# Patient Record
Sex: FEMALE | Race: BLACK OR AFRICAN AMERICAN | NOT HISPANIC OR LATINO | Employment: FULL TIME | URBAN - METROPOLITAN AREA
[De-identification: names, ages, dates, MRNs, and addresses within clinical notes are randomized per-mention and may not be internally consistent; named-entity substitution may affect disease eponyms.]

---

## 2023-04-28 LAB
CHLAMYDIA TRACH., AMPLIFIED: NEGATIVE
N. GONORRHEA, AMPLIFIED: NEGATIVE

## 2023-05-01 LAB
PROLACTIN (UG/L) IN SER/PLAS: 2.3 UG/L (ref 3–20)
THYROTROPIN (MIU/L) IN SER/PLAS BY DETECTION LIMIT <= 0.05 MIU/L: 1.71 MIU/L (ref 0.44–3.98)

## 2023-10-26 ENCOUNTER — TELEPHONE (OUTPATIENT)
Dept: PRIMARY CARE | Facility: HOSPITAL | Age: 38
End: 2023-10-26
Payer: COMMERCIAL

## 2023-10-26 DIAGNOSIS — I10 PRIMARY HYPERTENSION: Primary | ICD-10-CM

## 2023-10-26 RX ORDER — CARVEDILOL 25 MG/1
25 TABLET ORAL
Qty: 60 TABLET | Refills: 0 | Status: SHIPPED | OUTPATIENT
Start: 2023-10-26 | End: 2023-11-08 | Stop reason: SDUPTHER

## 2023-10-26 NOTE — TELEPHONE ENCOUNTER
Give patient enough Carvedilol to last until her appointment on 11/8/23.   States she want it to go to [facility]  [id number] [facility]

## 2023-11-07 PROBLEM — G47.33 OBSTRUCTIVE SLEEP APNEA (ADULT) (PEDIATRIC): Status: ACTIVE | Noted: 2023-11-07

## 2023-11-07 PROBLEM — E78.5 HYPERLIPIDEMIA: Status: ACTIVE | Noted: 2023-11-07

## 2023-11-07 PROBLEM — N76.0 VAGINITIS: Status: ACTIVE | Noted: 2023-11-07

## 2023-11-07 PROBLEM — R29.818 SUSPECTED SLEEP APNEA: Status: ACTIVE | Noted: 2023-11-07

## 2023-11-07 PROBLEM — N92.6 MISSED MENSES: Status: ACTIVE | Noted: 2023-11-07

## 2023-11-07 PROBLEM — E87.6 HYPOKALEMIA: Status: ACTIVE | Noted: 2023-11-07

## 2023-11-07 PROBLEM — I10 SEVERE UNCONTROLLED HYPERTENSION: Status: ACTIVE | Noted: 2023-11-07

## 2023-11-07 PROBLEM — L91.0 KELOID: Status: ACTIVE | Noted: 2021-07-20

## 2023-11-07 PROBLEM — B96.89 BACTERIAL VAGINOSIS: Status: ACTIVE | Noted: 2023-11-07

## 2023-11-07 PROBLEM — E04.9 GOITER: Status: ACTIVE | Noted: 2023-11-07

## 2023-11-07 PROBLEM — N76.0 BACTERIAL VAGINOSIS: Status: ACTIVE | Noted: 2023-11-07

## 2023-11-07 PROBLEM — I10 HYPERTENSION: Status: ACTIVE | Noted: 2023-11-07

## 2023-11-07 PROBLEM — I89.0 LYMPHEDEMA: Status: ACTIVE | Noted: 2023-11-07

## 2023-11-07 PROBLEM — L70.0 ACNE VULGARIS: Status: ACTIVE | Noted: 2021-07-20

## 2023-11-07 PROBLEM — A60.00 GENITAL HSV: Status: ACTIVE | Noted: 2023-11-07

## 2023-11-07 PROBLEM — E55.9 VITAMIN D DEFICIENCY: Status: ACTIVE | Noted: 2023-11-07

## 2023-11-07 PROBLEM — T14.8XXA SURGICAL WOUND PRESENT: Status: ACTIVE | Noted: 2023-11-07

## 2023-11-07 PROBLEM — F17.200 NICOTINE DEPENDENCE: Status: ACTIVE | Noted: 2023-11-07

## 2023-11-07 PROBLEM — E66.9 OBESITY (BMI 30.0-34.9): Status: ACTIVE | Noted: 2023-11-07

## 2023-11-07 PROBLEM — E66.811 OBESITY (BMI 30.0-34.9): Status: ACTIVE | Noted: 2023-11-07

## 2023-11-07 PROBLEM — L81.9 DISORDER OF PIGMENTATION, UNSPECIFIED: Status: ACTIVE | Noted: 2021-07-20

## 2023-11-07 RX ORDER — ATORVASTATIN CALCIUM 40 MG/1
1 TABLET, FILM COATED ORAL NIGHTLY
COMMUNITY
Start: 2020-06-23 | End: 2023-11-08 | Stop reason: ALTCHOICE

## 2023-11-07 RX ORDER — LEVONORGESTREL AND ETHINYL ESTRADIOL 0.15-0.03
1 KIT ORAL DAILY
COMMUNITY
End: 2024-04-08

## 2023-11-07 RX ORDER — POTASSIUM CHLORIDE 750 MG/1
10 TABLET, FILM COATED, EXTENDED RELEASE ORAL DAILY
COMMUNITY
Start: 2020-07-29 | End: 2023-11-08 | Stop reason: WASHOUT

## 2023-11-07 RX ORDER — CARVEDILOL 6.25 MG/1
1 TABLET ORAL
COMMUNITY
Start: 2021-11-29 | End: 2023-11-08 | Stop reason: WASHOUT

## 2023-11-07 RX ORDER — IBUPROFEN 600 MG/1
600 TABLET ORAL EVERY 6 HOURS
COMMUNITY
Start: 2020-01-03

## 2023-11-07 RX ORDER — AMLODIPINE BESYLATE 10 MG/1
10 TABLET ORAL DAILY
COMMUNITY
End: 2023-11-08 | Stop reason: SDUPTHER

## 2023-11-07 RX ORDER — CHLORHEXIDINE GLUCONATE ORAL RINSE 1.2 MG/ML
15 SOLUTION DENTAL AS NEEDED
COMMUNITY
Start: 2022-12-18

## 2023-11-07 RX ORDER — TRETINOIN 0.25 MG/G
1 CREAM TOPICAL NIGHTLY
COMMUNITY
Start: 2021-07-16

## 2023-11-07 RX ORDER — MICONAZOLE NITRATE 2 %
2 CREAM (GRAM) TOPICAL AS NEEDED
COMMUNITY
Start: 2022-04-05

## 2023-11-07 RX ORDER — CARVEDILOL 12.5 MG/1
1 TABLET ORAL 2 TIMES DAILY
COMMUNITY
Start: 2021-03-09 | End: 2023-11-08 | Stop reason: WASHOUT

## 2023-11-07 RX ORDER — CHLORTHALIDONE 25 MG/1
25 TABLET ORAL EVERY 24 HOURS
COMMUNITY
End: 2023-11-08 | Stop reason: WASHOUT

## 2023-11-07 RX ORDER — VIT C/E/ZN/COPPR/LUTEIN/ZEAXAN 250MG-90MG
1 CAPSULE ORAL DAILY
COMMUNITY
Start: 2022-03-14

## 2023-11-07 RX ORDER — LEVONORGESTREL AND ETHINYL ESTRADIOL 0.15-0.03
1 KIT ORAL DAILY
COMMUNITY

## 2023-11-08 ENCOUNTER — OFFICE VISIT (OUTPATIENT)
Dept: PRIMARY CARE | Facility: HOSPITAL | Age: 38
End: 2023-11-08
Payer: COMMERCIAL

## 2023-11-08 ENCOUNTER — APPOINTMENT (OUTPATIENT)
Dept: PRIMARY CARE | Facility: HOSPITAL | Age: 38
End: 2023-11-08
Payer: COMMERCIAL

## 2023-11-08 VITALS
OXYGEN SATURATION: 98 % | HEART RATE: 73 BPM | BODY MASS INDEX: 37.39 KG/M2 | TEMPERATURE: 98 F | SYSTOLIC BLOOD PRESSURE: 129 MMHG | DIASTOLIC BLOOD PRESSURE: 85 MMHG | HEIGHT: 63 IN | WEIGHT: 211 LBS

## 2023-11-08 DIAGNOSIS — E78.5 HYPERLIPIDEMIA, UNSPECIFIED HYPERLIPIDEMIA TYPE: Primary | ICD-10-CM

## 2023-11-08 DIAGNOSIS — Z00.00 HEALTHCARE MAINTENANCE: ICD-10-CM

## 2023-11-08 DIAGNOSIS — I10 PRIMARY HYPERTENSION: ICD-10-CM

## 2023-11-08 LAB
ALBUMIN SERPL BCP-MCNC: 4.5 G/DL (ref 3.4–5)
ALP SERPL-CCNC: 32 U/L (ref 33–110)
ALT SERPL W P-5'-P-CCNC: 20 U/L (ref 7–45)
ANION GAP SERPL CALC-SCNC: 14 MMOL/L (ref 10–20)
AST SERPL W P-5'-P-CCNC: 17 U/L (ref 9–39)
BILIRUB SERPL-MCNC: 0.4 MG/DL (ref 0–1.2)
BUN SERPL-MCNC: 10 MG/DL (ref 6–23)
CALCIUM SERPL-MCNC: 9.2 MG/DL (ref 8.6–10.6)
CHLORIDE SERPL-SCNC: 107 MMOL/L (ref 98–107)
CHOLEST SERPL-MCNC: 157 MG/DL (ref 0–199)
CHOLESTEROL/HDL RATIO: 3.8
CO2 SERPL-SCNC: 21 MMOL/L (ref 21–32)
CREAT SERPL-MCNC: 0.63 MG/DL (ref 0.5–1.05)
ERYTHROCYTE [DISTWIDTH] IN BLOOD BY AUTOMATED COUNT: 13.7 % (ref 11.5–14.5)
EST. AVERAGE GLUCOSE BLD GHB EST-MCNC: 108 MG/DL
GFR SERPL CREATININE-BSD FRML MDRD: >90 ML/MIN/1.73M*2
GLUCOSE SERPL-MCNC: 90 MG/DL (ref 74–99)
HBA1C MFR BLD: 5.4 %
HCT VFR BLD AUTO: 39.1 % (ref 36–46)
HDLC SERPL-MCNC: 40.8 MG/DL
HGB BLD-MCNC: 13 G/DL (ref 12–16)
LDLC SERPL CALC-MCNC: 78 MG/DL
MCH RBC QN AUTO: 31.7 PG (ref 26–34)
MCHC RBC AUTO-ENTMCNC: 33.2 G/DL (ref 32–36)
MCV RBC AUTO: 95 FL (ref 80–100)
NON HDL CHOLESTEROL: 116 MG/DL (ref 0–149)
NRBC BLD-RTO: 0 /100 WBCS (ref 0–0)
PLATELET # BLD AUTO: 317 X10*3/UL (ref 150–450)
POTASSIUM SERPL-SCNC: 3.8 MMOL/L (ref 3.5–5.3)
PROT SERPL-MCNC: 7.4 G/DL (ref 6.4–8.2)
RBC # BLD AUTO: 4.1 X10*6/UL (ref 4–5.2)
SODIUM SERPL-SCNC: 138 MMOL/L (ref 136–145)
TRIGL SERPL-MCNC: 189 MG/DL (ref 0–149)
VLDL: 38 MG/DL (ref 0–40)
WBC # BLD AUTO: 8.8 X10*3/UL (ref 4.4–11.3)

## 2023-11-08 PROCEDURE — 85027 COMPLETE CBC AUTOMATED: CPT

## 2023-11-08 PROCEDURE — 80053 COMPREHEN METABOLIC PANEL: CPT

## 2023-11-08 PROCEDURE — 83036 HEMOGLOBIN GLYCOSYLATED A1C: CPT

## 2023-11-08 PROCEDURE — 3074F SYST BP LT 130 MM HG: CPT

## 2023-11-08 PROCEDURE — 3079F DIAST BP 80-89 MM HG: CPT

## 2023-11-08 PROCEDURE — 36415 COLL VENOUS BLD VENIPUNCTURE: CPT | Mod: GC

## 2023-11-08 PROCEDURE — 36415 COLL VENOUS BLD VENIPUNCTURE: CPT

## 2023-11-08 PROCEDURE — 80061 LIPID PANEL: CPT

## 2023-11-08 PROCEDURE — 99214 OFFICE O/P EST MOD 30 MIN: CPT

## 2023-11-08 PROCEDURE — 99214 OFFICE O/P EST MOD 30 MIN: CPT | Mod: GC

## 2023-11-08 RX ORDER — AMLODIPINE BESYLATE 10 MG/1
10 TABLET ORAL DAILY
Qty: 30 TABLET | Refills: 11 | Status: SHIPPED | OUTPATIENT
Start: 2023-11-08 | End: 2024-11-07

## 2023-11-08 RX ORDER — CARVEDILOL 25 MG/1
25 TABLET ORAL
Qty: 60 TABLET | Refills: 11 | Status: SHIPPED | OUTPATIENT
Start: 2023-11-08 | End: 2024-11-07

## 2023-11-08 ASSESSMENT — ENCOUNTER SYMPTOMS
OCCASIONAL FEELINGS OF UNSTEADINESS: 0
LOSS OF SENSATION IN FEET: 0
DEPRESSION: 0

## 2023-11-08 ASSESSMENT — PAIN SCALES - GENERAL: PAINLEVEL: 0-NO PAIN

## 2023-11-08 ASSESSMENT — PATIENT HEALTH QUESTIONNAIRE - PHQ9
1. LITTLE INTEREST OR PLEASURE IN DOING THINGS: NOT AT ALL
SUM OF ALL RESPONSES TO PHQ9 QUESTIONS 1 AND 2: 0
2. FEELING DOWN, DEPRESSED OR HOPELESS: NOT AT ALL

## 2023-11-08 NOTE — PATIENT INSTRUCTIONS
As discussed today, our plan is:     Labs: you can get this done today or come back anytime in the next 4-6 weeks at any  facility: CBC, CMP, Lipid Panel, and A1c all done in house.   Medication changes: Discontinued Atorvastatin  Consultations - you were referred to see the following specialists: none    Please come back to see me in: 1 year  ------  If you have any problems or questions, please contact the clinic at [phone] to leave a message. If your issue cannot wait until the next business day, please go to urgent care or the emergency department.     I also strongly urge all of my patients to register for [facility] by going to: [email or web address]  (The  staff can also send you a text/email link to register when you check out).    No shows: It is understandable if you are unable to make it to a visit, but please cancel your appointment instead of not showing up. This helps to give other patients access to primary care and keeps wait times low.      Jorge Montgomery MD, MPH  Internal Medicine   [facility]   [phone]

## 2023-11-08 NOTE — PROGRESS NOTES
HPI:   Mrs Beth is a 35 yo F with PMH HTN, HLD, vitamin D insufficiency who presents to the [facility] today for follow-up.  She states that her blood pressure has been under control since her medication was adjusted during her last visit. She has not picked up the BP cuff that was ordered for her and therefore has not been checking her BP at home as encouraged. She continues to smoke 3-4 cigarettes per week despite using the nicotine gum. She states that its hard for her to quit even with the gum. She also continues to drink alcohol, however she has cut back to 2-3 drinks per week. She reports adhereing to her BP medications, however she does occasionally forget to take her atorvastatin. She feels much better about her health overall and has no new symptoms or concerns.         Review of Systems   Constitutional:  Negative for fatigue, fever and unexpected weight change.   HENT:  Negative for congestion, rhinorrhea, sinus pain, trouble swallowing and voice change.    Eyes:  Negative for photophobia, pain and redness.   Respiratory:  Negative for cough, choking, shortness of breath, wheezing and stridor.    Cardiovascular:  Negative for chest pain, palpitations and leg swelling.   Gastrointestinal:  Negative for abdominal distention, abdominal pain, blood in stool, constipation, diarrhea, nausea and vomiting.   Endocrine: Negative for polydipsia, polyphagia and polyuria.   Genitourinary:  Negative for dysuria, flank pain, frequency, hematuria and urgency.   Musculoskeletal:  Negative for gait problem and myalgias.   Skin:  Negative for color change, pallor and rash.   Neurological:  Negative for dizziness, syncope, weakness, light-headedness and headaches.   Hematological:  Negative for adenopathy.   Psychiatric/Behavioral:  Negative for agitation, sleep disturbance and suicidal ideas.        Prior to Admission medications    Medication Sig Start Date End Date Taking? Authorizing Provider   Lopez Biswas,  0.15-0.03 mg tablet Take 1 tablet by mouth once daily.    Historical Provider, MD   amLODIPine (Norvasc) 10 mg tablet Take 1 tablet (10 mg) by mouth once daily.    Historical Provider, MD   atorvastatin (Lipitor) 40 mg tablet Take 1 tablet (40 mg) by mouth once daily at bedtime. 6/23/20   Historical Provider, MD   carvedilol (Coreg) 12.5 mg tablet Take 1 tablet (12.5 mg) by mouth 2 times a day. 3/9/21   Historical Provider, MD   carvedilol (Coreg) 25 mg tablet Take 1 tablet (25 mg) by mouth 2 times a day with meals. 10/26/23 10/25/24  Hayes Gilbert MD   carvedilol (Coreg) 6.25 mg tablet Take 1 tablet (6.25 mg) by mouth 2 times a day with meals. 11/29/21   Historical Provider, MD   chlorhexidine (Peridex) 0.12 % solution Use 15 mL in the mouth or throat if needed for wound care. 12/18/22   Historical Provider, MD   chlorthalidone (Hygroton) 25 mg tablet Take 1 tablet (25 mg) by mouth once every 24 hours.    Historical Provider, MD   cholecalciferol (Vitamin D-3) 25 MCG (1000 UT) capsule Take 1 capsule (25 mcg) by mouth once daily. 3/14/22   Historical Provider, MD   HYDROCHLOROTHIAZIDE ORAL Take by mouth.    Historical Provider, MD   ibuprofen 600 mg tablet Take 1 tablet (600 mg) by mouth every 6 hours. 1/3/20   Historical Provider, MD   levonorgestreL-ethinyl estrad (Altavera, 28,) 0.15-0.03 mg tablet Take 1 tablet by mouth once daily.    Historical Provider, MD   nicotine polacrilex (Nicorette) 2 mg gum Chew 1 each (2 mg) if needed for smoking cessation. 4/5/22   Historical Provider, MD   potassium chloride CR (Klor-Con 10) 10 mEq ER tablet Take 1 tablet (10 mEq) by mouth once daily. 7/29/20   Historical Provider, MD   tretinoin (Retin-A) 0.025 % cream Apply 1 Application topically once daily at bedtime. 7/16/21   Historical Provider, MD         Physical Exam  Constitutional:       General: She is not in acute distress.     Appearance: Normal appearance. She is not ill-appearing.   HENT:      Head:  Normocephalic and atraumatic.      Nose: Nose normal. No congestion or rhinorrhea.      Mouth/Throat:      Mouth: Mucous membranes are moist.      Pharynx: Oropharynx is clear.   Eyes:      General: No scleral icterus.     Extraocular Movements: Extraocular movements intact.      Conjunctiva/sclera: Conjunctivae normal.      Pupils: Pupils are equal, round, and reactive to light.   Cardiovascular:      Rate and Rhythm: Normal rate and regular rhythm.      Pulses: Normal pulses.      Heart sounds: Normal heart sounds.   Pulmonary:      Effort: Pulmonary effort is normal.      Breath sounds: Normal breath sounds.   Abdominal:      General: Abdomen is flat. Bowel sounds are normal. There is no distension.      Palpations: Abdomen is soft.      Tenderness: There is no abdominal tenderness. There is no right CVA tenderness, left CVA tenderness or guarding.   Musculoskeletal:         General: No swelling.      Cervical back: Normal range of motion and neck supple.      Right lower leg: No edema.      Left lower leg: No edema.   Skin:     General: Skin is warm and dry.      Capillary Refill: Capillary refill takes less than 2 seconds.      Coloration: Skin is not jaundiced.      Findings: No lesion or rash.   Neurological:      General: No focal deficit present.      Mental Status: She is alert and oriented to person, place, and time.   Psychiatric:         Mood and Affect: Mood normal.         Behavior: Behavior normal.         Thought Content: Thought content normal.           Assessment/Plan:   Mrs Beth is a 37 yo F with PMH HTN, HLD, vitamin D insufficiency who presents to the [facility] today for follow-up. Since last visit in 03/2021, HTN has been an ongoing issue and coreg dose was increased to 12.5 bid in addition to continuing regularly with amlodipine 10. Today BP is 129/85, consistent with previous visits. And stable for the past year. Patient has started to improve lifestyle habits with healthier  diet and exercise recently.      #HTN  - Adequately controlled with current medication regimen   - On amlodipine 10 mg and Carvedilol 25 BID  - Encouraged smoking cessation, starting nicotine patches and gum for cravings  - Alcohol consumption reduction from 3 shots x 2 days a week on weekends to 1 day a week  - Low salt diet      #REMIGIO  - Very severe based on 12/2021 HST  - Reports using CPAP every night and her REMIGIO symptoms have improved dramatically.  - discussed importance of using cpap regularly and the positive cardiopulmonary benefits with regular and long-term use     #HLD   - Lipid panel 03/2022: Total Chol 222, HDL 44.6 (down from 53.6),  (down from) 170  - improved LDL over past year, worsening of HDL slightly  -ASCVD score: no statin recommended given age <40yrs   - Stop Atorvastatin 40mg daily      #Vitamin D insufficiency  - 15 on 3/2022, was 17 year prior  - Currently recently rx 1000 IU daily, previously was 2000 IU daily or 28458 IU weekly cholecalciferol   - Recheck level in at next visit then consider increasing to 2000 IU daily or 78605 weekly     #Contraception  - currently back on COCPs Ethinyl estradiol and levonorgestrel Altavera  - Gyn in the past had stopped COCPs in the setting of uncontrolled HTN and HLD, and had transitioned to norethindrone 0.35 daily   - However pt was restarted on the COCPs in 01/2022 or 03/2022 at obgyn appointment  - To be addressed at next appointment.     #Health maintenance  - S/p pap Feb 2020, NILM/HPV neg, for repeat cotest in 5 years  - Declines flu shot this season  - HIV Ab screen and syphilis screening negative in 2020  - Td booster 03/2021  - Notes completion of childhood vaccines  - Covid x2, encouraged to get 3rd vaccine.      Referrals: None  Return to clinic in 1 year for follow-up.    Jorge Montgomery MD. MPH  Internal Medicine  [facility]   [phone]

## 2023-11-10 ASSESSMENT — ENCOUNTER SYMPTOMS
DYSURIA: 0
SINUS PAIN: 0
LIGHT-HEADEDNESS: 0
ABDOMINAL DISTENTION: 0
CONSTIPATION: 0
DIZZINESS: 0
HEMATURIA: 0
FATIGUE: 0
HEADACHES: 0
SLEEP DISTURBANCE: 0
FEVER: 0
PHOTOPHOBIA: 0
WEAKNESS: 0
STRIDOR: 0
EYE REDNESS: 0
DIARRHEA: 0
BLOOD IN STOOL: 0
PALPITATIONS: 0
CHOKING: 0
COLOR CHANGE: 0
WHEEZING: 0
SHORTNESS OF BREATH: 0
AGITATION: 0
NAUSEA: 0
VOMITING: 0
POLYDIPSIA: 0
FLANK PAIN: 0
MYALGIAS: 0
ADENOPATHY: 0
ABDOMINAL PAIN: 0
TROUBLE SWALLOWING: 0
EYE PAIN: 0
FREQUENCY: 0
RHINORRHEA: 0
UNEXPECTED WEIGHT CHANGE: 0
COUGH: 0
POLYPHAGIA: 0
VOICE CHANGE: 0

## 2023-11-12 NOTE — PROGRESS NOTES
I saw and evaluated the patient. I personally obtained the key and critical portions of the history and physical exam or was physically present for key and critical portions performed by the resident/fellow. I reviewed the resident/fellow's documentation and discussed the patient with the resident/fellow. I agree with the resident/fellow's medical decision making as documented in the note.    Henrry Ann MD MPH

## 2024-04-06 DIAGNOSIS — Z76.0 MEDICATION REFILL: ICD-10-CM

## 2024-04-08 RX ORDER — LEVONORGESTREL AND ETHINYL ESTRADIOL 0.15-0.03
1 KIT ORAL DAILY
Qty: 84 TABLET | Refills: 3 | Status: SHIPPED | OUTPATIENT
Start: 2024-04-08

## 2024-04-08 NOTE — TELEPHONE ENCOUNTER
Called pt and she said she didn't realize she had to call the other provider as he new establish care and she thought Dr Houston was an OB/GYN, to which I explained that she was not. Also told her to call the other dr but she insist that Dr Houston give her a 1 month supply and I explained she has not seen dr Houston since 2020 and she still wanted to have Dr Houston still fill because its a birth control. I told her to still make call to other  nut I would srill send this to Dr Houston  
Patient saw you back in 2020 and has since established care with dr obrien - please advise on populated refill  
Pt is requesting a refill on birth control, please send to [facility].   
Detail Level: Detailed
General Sunscreen Counseling: Sun precautions advised, including protective clothing, broad spectrum sunscreen to unprotected skin, seeking shade, avoiding midday sun exposure, and use of [employer]are. Vitamin D supplementation when appropriate.

## 2024-05-15 ENCOUNTER — TELEPHONE (OUTPATIENT)
Dept: PRIMARY CARE | Facility: HOSPITAL | Age: 39
End: 2024-05-15
Payer: COMMERCIAL

## 2024-07-18 ENCOUNTER — TELEPHONE (OUTPATIENT)
Dept: SLEEP MEDICINE | Facility: HOSPITAL | Age: 39
End: 2024-07-18
Payer: COMMERCIAL

## 2024-07-18 DIAGNOSIS — G47.33 OBSTRUCTIVE SLEEP APNEA (ADULT) (PEDIATRIC): ICD-10-CM

## 2024-07-18 NOTE — TELEPHONE ENCOUNTER
Patient called to request a new CPAP Supply Rx be sent to [facility] because they told her hers . Patient states she wears a nasal interface.    Patient is scheduled for follow-up on 10/29/2024.

## 2024-10-29 ENCOUNTER — APPOINTMENT (OUTPATIENT)
Dept: SLEEP MEDICINE | Facility: HOSPITAL | Age: 39
End: 2024-10-29
Payer: COMMERCIAL

## 2024-11-26 DIAGNOSIS — I10 PRIMARY HYPERTENSION: ICD-10-CM

## 2024-11-27 RX ORDER — AMLODIPINE BESYLATE 10 MG/1
10 TABLET ORAL DAILY
Qty: 90 TABLET | Refills: 3 | OUTPATIENT
Start: 2024-11-27

## 2024-12-24 DIAGNOSIS — I10 PRIMARY HYPERTENSION: ICD-10-CM

## 2024-12-24 RX ORDER — CARVEDILOL 25 MG/1
TABLET ORAL
Qty: 180 TABLET | Refills: 3 | OUTPATIENT
Start: 2024-12-24

## 2024-12-24 NOTE — TELEPHONE ENCOUNTER
Patient overdue for follow up, last visit over a year ago call office at [phone] to schedule appointment.

## 2024-12-27 ENCOUNTER — DOCUMENTATION (OUTPATIENT)
Dept: PRIMARY CARE | Facility: HOSPITAL | Age: 39
End: 2024-12-27
Payer: COMMERCIAL

## 2024-12-27 DIAGNOSIS — I10 PRIMARY HYPERTENSION: ICD-10-CM

## 2024-12-27 RX ORDER — AMLODIPINE BESYLATE 10 MG/1
10 TABLET ORAL DAILY
Qty: 30 TABLET | Refills: 11 | OUTPATIENT
Start: 2024-12-27 | End: 2025-12-27

## 2024-12-27 RX ORDER — CARVEDILOL 25 MG/1
25 TABLET ORAL
Qty: 60 TABLET | Refills: 11 | OUTPATIENT
Start: 2024-12-27 | End: 2025-12-27

## 2024-12-27 NOTE — PROGRESS NOTES
Patient called for BP meds refill LOV 11/8/23 per Dr. Ann the patient will need to be seen to get RX. Patient demanded to be seen right now because today is her only off day. Patient was advised the office will be closing at 4 and she can't be seen right now. Patient was offered an appointment on Monday 12/30 and patient declined states she has to go to work. Call was placed on Hold because Patient demanded to speak with a nurse.

## 2024-12-27 NOTE — PROGRESS NOTES
Contacted patient per request to talk and explained [facility] could not refill her RX for amlodipine because she hasnot seen in 13 months. Has two pills left. Appt available at 245PM this Monday 3 days from now. States she can't - no PTO. Upset states she called at 0900 this morning and didn't know this. I explained that Dr Ann wants to have her seen first in the clinic before prescribing. Gave her several options: appt Monday, call pharmacy for emergency refill or go to an Urgent Care where they can access your chart. Follow up 1/14/25 as scheduled.Pt had no further questions. .

## 2024-12-27 NOTE — TELEPHONE ENCOUNTER
Last visit over a year ago, needs to keep upcoming appointment for refills or call for sooner appointment if needed.

## 2025-01-14 ENCOUNTER — APPOINTMENT (OUTPATIENT)
Dept: PRIMARY CARE | Facility: HOSPITAL | Age: 40
End: 2025-01-14
Payer: COMMERCIAL

## 2025-01-16 ENCOUNTER — OFFICE VISIT (OUTPATIENT)
Dept: PRIMARY CARE | Facility: HOSPITAL | Age: 40
End: 2025-01-16
Payer: MEDICARE

## 2025-01-16 VITALS
HEIGHT: 63 IN | HEART RATE: 76 BPM | OXYGEN SATURATION: 96 % | DIASTOLIC BLOOD PRESSURE: 85 MMHG | BODY MASS INDEX: 36.32 KG/M2 | SYSTOLIC BLOOD PRESSURE: 125 MMHG | TEMPERATURE: 96.8 F | WEIGHT: 205 LBS

## 2025-01-16 DIAGNOSIS — Z00.00 HEALTHCARE MAINTENANCE: Primary | ICD-10-CM

## 2025-01-16 DIAGNOSIS — I10 PRIMARY HYPERTENSION: ICD-10-CM

## 2025-01-16 LAB
ALBUMIN SERPL BCP-MCNC: 4.4 G/DL (ref 3.4–5)
ALP SERPL-CCNC: 43 U/L (ref 33–110)
ALT SERPL W P-5'-P-CCNC: 17 U/L (ref 7–45)
ANION GAP SERPL CALC-SCNC: 14 MMOL/L (ref 10–20)
AST SERPL W P-5'-P-CCNC: 18 U/L (ref 9–39)
BASOPHILS # BLD AUTO: 0.04 X10*3/UL (ref 0–0.1)
BASOPHILS NFR BLD AUTO: 0.5 %
BILIRUB SERPL-MCNC: 0.5 MG/DL (ref 0–1.2)
BUN SERPL-MCNC: 14 MG/DL (ref 6–23)
CALCIUM SERPL-MCNC: 9.3 MG/DL (ref 8.6–10.6)
CHLORIDE SERPL-SCNC: 103 MMOL/L (ref 98–107)
CHOLEST SERPL-MCNC: 220 MG/DL (ref 0–199)
CHOLESTEROL/HDL RATIO: 5.3
CO2 SERPL-SCNC: 24 MMOL/L (ref 21–32)
CREAT SERPL-MCNC: 0.62 MG/DL (ref 0.5–1.05)
EGFRCR SERPLBLD CKD-EPI 2021: >90 ML/MIN/1.73M*2
EOSINOPHIL # BLD AUTO: 0.14 X10*3/UL (ref 0–0.7)
EOSINOPHIL NFR BLD AUTO: 1.8 %
ERYTHROCYTE [DISTWIDTH] IN BLOOD BY AUTOMATED COUNT: 13.2 % (ref 11.5–14.5)
EST. AVERAGE GLUCOSE BLD GHB EST-MCNC: 103 MG/DL
GLUCOSE SERPL-MCNC: 84 MG/DL (ref 74–99)
HBA1C MFR BLD: 5.2 %
HCT VFR BLD AUTO: 40.7 % (ref 36–46)
HDLC SERPL-MCNC: 41.4 MG/DL
HGB BLD-MCNC: 14.1 G/DL (ref 12–16)
IMM GRANULOCYTES # BLD AUTO: 0.02 X10*3/UL (ref 0–0.7)
IMM GRANULOCYTES NFR BLD AUTO: 0.3 % (ref 0–0.9)
LDLC SERPL CALC-MCNC: ABNORMAL MG/DL
LYMPHOCYTES # BLD AUTO: 3.54 X10*3/UL (ref 1.2–4.8)
LYMPHOCYTES NFR BLD AUTO: 46.7 %
MAGNESIUM SERPL-MCNC: 2.22 MG/DL (ref 1.6–2.4)
MCH RBC QN AUTO: 32.9 PG (ref 26–34)
MCHC RBC AUTO-ENTMCNC: 34.6 G/DL (ref 32–36)
MCV RBC AUTO: 95 FL (ref 80–100)
MONOCYTES # BLD AUTO: 0.66 X10*3/UL (ref 0.1–1)
MONOCYTES NFR BLD AUTO: 8.7 %
NEUTROPHILS # BLD AUTO: 3.18 X10*3/UL (ref 1.2–7.7)
NEUTROPHILS NFR BLD AUTO: 42 %
NON HDL CHOLESTEROL: 179 MG/DL (ref 0–149)
NRBC BLD-RTO: 0 /100 WBCS (ref 0–0)
PLATELET # BLD AUTO: 348 X10*3/UL (ref 150–450)
POTASSIUM SERPL-SCNC: 4.3 MMOL/L (ref 3.5–5.3)
PROT SERPL-MCNC: 7.6 G/DL (ref 6.4–8.2)
RBC # BLD AUTO: 4.29 X10*6/UL (ref 4–5.2)
SODIUM SERPL-SCNC: 137 MMOL/L (ref 136–145)
TRIGL SERPL-MCNC: 403 MG/DL (ref 0–149)
TSH SERPL-ACNC: 2.3 MIU/L (ref 0.44–3.98)
VLDL: ABNORMAL
WBC # BLD AUTO: 7.6 X10*3/UL (ref 4.4–11.3)

## 2025-01-16 PROCEDURE — 82306 VITAMIN D 25 HYDROXY: CPT

## 2025-01-16 PROCEDURE — 36415 COLL VENOUS BLD VENIPUNCTURE: CPT

## 2025-01-16 PROCEDURE — 80061 LIPID PANEL: CPT

## 2025-01-16 PROCEDURE — 80053 COMPREHEN METABOLIC PANEL: CPT

## 2025-01-16 PROCEDURE — 83036 HEMOGLOBIN GLYCOSYLATED A1C: CPT

## 2025-01-16 PROCEDURE — 84443 ASSAY THYROID STIM HORMONE: CPT

## 2025-01-16 PROCEDURE — 85025 COMPLETE CBC W/AUTO DIFF WBC: CPT

## 2025-01-16 PROCEDURE — 83735 ASSAY OF MAGNESIUM: CPT

## 2025-01-16 PROCEDURE — 99214 OFFICE O/P EST MOD 30 MIN: CPT | Mod: GC

## 2025-01-16 RX ORDER — AMLODIPINE BESYLATE 10 MG/1
10 TABLET ORAL DAILY
Qty: 90 TABLET | Refills: 3 | Status: SHIPPED | OUTPATIENT
Start: 2025-01-16 | End: 2026-01-16

## 2025-01-16 RX ORDER — CARVEDILOL 25 MG/1
25 TABLET ORAL
Qty: 90 TABLET | Refills: 5 | Status: SHIPPED | OUTPATIENT
Start: 2025-01-16 | End: 2025-10-13

## 2025-01-16 ASSESSMENT — ENCOUNTER SYMPTOMS
DEPRESSION: 0
LOSS OF SENSATION IN FEET: 0
OCCASIONAL FEELINGS OF UNSTEADINESS: 0

## 2025-01-16 ASSESSMENT — PAIN SCALES - GENERAL: PAINLEVEL_OUTOF10: 0-NO PAIN

## 2025-01-16 ASSESSMENT — PATIENT HEALTH QUESTIONNAIRE - PHQ9
1. LITTLE INTEREST OR PLEASURE IN DOING THINGS: NOT AT ALL
2. FEELING DOWN, DEPRESSED OR HOPELESS: NOT AT ALL
SUM OF ALL RESPONSES TO PHQ9 QUESTIONS 1 AND 2: 0

## 2025-01-16 NOTE — PATIENT INSTRUCTIONS
As discussed today, our plan is:     For your weight, I discussed healthier meal options and mediterranean diet. I Have attached references. I also discussed exercise, and included the 12/3/30 treadmill workout that helps a lot of patients. I think easing into this would be beneficial. We can talk weight loss medications further at another visit, but there are insurance issues with covering them and I think improving diet and exercise as a first step is important.  Labs - we collected labs today and will call you with any abnormal results. If you have any questions or concerns prior to us calling feel free to call our office to have your questions addressed.   Medication changes: None, I refilled your blood pressure medications  4.   If you smoke or use other tobacco products, take steps to quit. Call [phone] for more information or to set up an appointment with  [facility]. The [place] Tobacco Quit Line is a free resource for people who don’t have insurance, receive Medicaid, pregnant women, or members of the [place] [employer] [facility]. Call 0-932-QUIT-NOW or 1-565.919.6605.    Please come back to see us me in 1 year or earlier if you have any questions  ------  If you have any problems or questions, please contact the clinic at [phone] to leave a message. Our fax number is [phone]. If your issue cannot wait until the next business day, please go to urgent care or the emergency department.     I also strongly urge all of my patients to register for [employer] by going to: [email or web address]  (The  staff can also send you a text/email link to register when you check out).    No shows: It is understandable if you are unable to make it to a visit, but please cancel your appointment instead of not showing up. This helps to give other patients access to primary care and keeps wait times low.        [facility]   [phone]     The 12-3-30  workout is a treadmill exercise that involves walking at an incline of 12 for 30 minutes at a speed of 3 miles per hour.     How it works  -The workout is designed to improve endurance and burn fat   -It's a cardio exercise that increases heart rate and helps maintain healthy blood pressure   -The incline can help build muscle, especially for beginners     How to do it   -Set the treadmill to an incline of 12 or lower  -Walk at a speed of 3 miles per hour for 30 minutes    Tips for beginners   -Start at a level-3 incline and walk for a short period of time  -Gradually build up to 30 minutes  -Listen to your body and adjust the duration and pace  -Take time to recover after the workout    Precautions  -Walking on a steep incline for 30 minutes can put pressure on your lower back   -If you have joint issues, ease yourself in   -If you have a history of leg, knee, or back pain, you should consult your healthcare provider before trying the workout

## 2025-01-16 NOTE — PROGRESS NOTES
[facility]    CC: FUV, med refill     HPI:  Marina Beth is a 39 y.o. female with PMHx including HTN, HLD, vitamin D insufficiency who presents for FUV.     Interval History/History per chart review:  Last seen at [facility] 11/2023 by Dr. Montgomery. At the time doing well, blood pressure well controlled on amlodipine 10 and coreg 25 BID. Encouraged smoking cessation with nicotine patch and gum    Per the patient:    Reports that over the past year she started intermittent fasting, eats from noon to 8pm, however since then she feels like she is snacking throughout the day more. Her first meal is salad with a piece of grilled chicken with tablespoon of ranch. She then feels hungry quickly and continues to eat multiple meals throughout the day. For dinner she eats whatever, like cheeseburgers etc. Also consumes 3 to 4 drinks each weekend day. Continues to smokes 1 cigars a day. Not interested in decreasing alcohol amount or smoking. She is interested in losing weight, she does home aerobic workouts 2-3 times a week. Inquires about weight loss medications however she does not want to jump to them right away.    Other than that she feels very well, continues to take coreg and amlodipine with no issues., no L/D/CP/SOB. Needs refills.    Health maintenance:  Health Maintenance   Topic Date Due    MMR Vaccines (1 of 1 - Standard series) Never done    Varicella Vaccines (1 of 2 - 13+ 2-dose series) Never done    Hepatitis B Vaccines (1 of 3 - 19+ 3-dose series) Never done    Pneumococcal Vaccine: Pediatrics and At-Risk Adult Patients (1 of 2 - PCV) Never done    Cervical Cancer Screening  02/03/2023    Influenza Vaccine (1) 09/01/2024    COVID-19 Vaccine (3 - 2024-25 season) 09/01/2024    Diabetes Screening  01/16/2026    Yearly Adult Physical  01/17/2026    Lipid Panel  01/16/2030    DTaP/Tdap/Td Vaccines (2 - Td or Tdap) 03/09/2031    Zoster Vaccines (1 of 2) 04/12/2035    HIV Screening  Completed     Hepatitis C Screening  Completed    HIB Vaccines  Aged Out    IPV Vaccines  Aged Out    Hepatitis A Vaccines  Aged Out    Meningococcal Vaccine  Aged Out    Rotavirus Vaccines  Aged Out    HPV Vaccines  Aged Out       Medications:    Current Outpatient Medications:     Altavera, 28, 0.15-0.03 mg tablet, TAKE 1 TABLET BY MOUTH EVERY DAY, Disp: 84 tablet, Rfl: 3    amLODIPine (Norvasc) 10 mg tablet, Take 1 tablet (10 mg) by mouth once daily., Disp: 90 tablet, Rfl: 3    carvedilol (Coreg) 25 mg tablet, Take 1 tablet (25 mg) by mouth 2 times daily (morning and late afternoon)., Disp: 90 tablet, Rfl: 5    chlorhexidine (Peridex) 0.12 % solution, Use 15 mL in the mouth or throat if needed for wound care., Disp: , Rfl:     cholecalciferol (Vitamin D-3) 25 MCG (1000 UT) capsule, Take 1 capsule (25 mcg) by mouth once daily., Disp: , Rfl:     ibuprofen 600 mg tablet, Take 1 tablet (600 mg) by mouth every 6 hours., Disp: , Rfl:     levonorgestreL-ethinyl estrad (Altavera, 28,) 0.15-0.03 mg tablet, Take 1 tablet by mouth once daily., Disp: , Rfl:     nicotine polacrilex (Nicorette) 2 mg gum, Chew 1 each (2 mg) if needed for smoking cessation., Disp: , Rfl:     tretinoin (Retin-A) 0.025 % cream, Apply 1 Application topically once daily at bedtime., Disp: , Rfl:     Require med refills? yes  Preferred pharmacy: [facility]       Past medical history:  Past Medical History:   Diagnosis Date    Encounter for gynecological examination (general) (routine) without abnormal findings     Well woman exam with routine gynecological exam    Encounter for pregnancy test, result positive ([facility]) 12/12/2019    Positive urine pregnancy test    Encounter for screening for infections with a predominantly sexual mode of transmission 02/03/2020    Screen for STD (sexually transmitted disease)    Encounter for screening for malignant neoplasm of cervix     Cervical cancer screening    Irregular menstruation, unspecified 12/12/2019    Missed menses     Unspecified pre-existing hypertension complicating pregnancy, unspecified trimester ([facility]) 12/12/2019    Chronic hypertension in pregnancy       LMP: few weeks ago  Sexual Hx: active with males, no issues    Allergies:  Allergies   Allergen Reactions    Amoxicillin Unknown       Surgical history:  Past Surgical History:   Procedure Laterality Date    OTHER SURGICAL HISTORY  02/03/2020    No history of surgery       Family history:  Family History   Problem Relation Name Age of Onset    Other (Cardiac Disorder) Mother      Diabetes Mother      Hypertension Mother      Other (Cardiac Disorder) Maternal Grandfather      Diabetes Maternal Grandfather      Hypertension Maternal Grandfather      Cancer Paternal Grandmother      Hypertension Other Multiple Family Members        Social history:   reports that she has been smoking cigarettes and cigars. She has quit using smokeless tobacco. She reports current alcohol use. She reports current drug use. Drug: Marijuana.      1. Living situation:   2. Work: works from home,   3. Alcohol: 8 drinks a week, only on weekends   4. Tobacco: 1-2 cigars a day  5. Other drugs: Marijuana, denies others  6. Diet/exercise: see HPI    Safety:  - Housing insecurity: denies  - Food insecurity: denies  - Weapons in the home: denies    Review of systems:  Constitutional: negative for fevers, chills, weight loss, weight gain, change in appetite, fatigue, weakness.  HEENT: negative for headache, changes in vision or hearing, congestion, sore throat.  Respiratory: negative for SOB, cough, hemoptysis, wheezing  Cardiovascular: negative for chest pain, palpitations, orthopnea, PND  GI: negative for dysphagia, abdominal pain, nausea, vomiting, diarrhea, constipation, melena, hematochezia, BRBPR  : negative for frequency, urgency, dysuria, hematuria, incontinence  MSK: negative for myalgia, arthralgia, decreased joint ROM, LE swelling  Skin: negative for rash,  wounds  Heme/lymph: negative for easy bruising, bleeding, epistaxis  Neuro: negative for LOC, numbness, tingling, tremor, vertigo, dizziness    Vitals:  Vitals:    01/16/25 1435   BP: 125/85   Pulse: 76   Temp: 36 °C (96.8 °F)   SpO2: 96%       Physical exam:  Constitutional: Well-developed female in no acute distress.  HEENT: NC/AT, sclera anicteric  Respiratory: CTAB. No wheezes, rales, or rhonchi. Normal respiratory effort.  Cardiovascular: RRR. No murmurs, gallops, or rubs.  Abdominal: Soft, nondistended, nontender to palpation. Bowel sounds present. No hepatosplenomegaly or masses.   Neuro: AAOx3. CN II-XII grossly intact. Tongue midline, no facial droop  MSK: No LE edema bilaterally. Moving extremities well  Skin: Warm, dry. No rashes or wounds.  Psych: Appropriate mood and affect.    Labs:  No results found for this or any previous visit (from the past 24 hours).    Imaging:  No results found.    Assessment and Plan:  Marina Beth is a 39 y.o. female PMHx including HTN, HLD, vitamin D insufficiency who presents to the [facility] today for follow-up and for med refill. She inquires about effective weight loss strategies, she has been intermittent fasting for a year and has lost 8-10 lbs but objectively over the past 5 years has gained about 20 lbs. Inquired about potentially being on a weight loss mediation like a GLP-1. I counseled her on eating better meals, and if she struggles with intermittent fasting then eating more frequently however being more mindful of what she eats, counting calories if she feels like it will help, and the mediterranean diet as a good starting point. Also encouraged her to cut back on some aspects If she wants to enjoy others, for example eating healthier dinners if she wants to drink on the weekends. Also advised about healthier snacks such as protein shakes. For exercise, I recommended increasing her aerobic exercise if goal is to lose weight and recommended low  impact treadmill walking on an incline, the popularized 12/3/30 method. Plan to RTC in 6 months, can refer to a dietician then if necessary and can later talk about medication options if she continues to struggle. BP well controlled on amlodipine and coreg.    #HTN  - Adequately controlled with current medication regimen   - On amlodipine 10 mg and Carvedilol 25 BID  - Encouraged smoking cessation, limiting alcohol consumption    #HLD   ::Lipid panel 03/2022: Total Chol 222, HDL 44.6 (down from 53.6),  (down from) 170> today cholesterol 220 LDL 78, TGL >400  ::ASCVD score: no statin recommended given age <40yrs   ::previously was on atorva which was stopped last visit  -will hold off on changes, TGL high but test was not fasting, will likely start statin at next visit as her ASCVD risk would req mod intensity statin if this continues when she turns 40    #REMIGIO  - Very severe based on 12/2021 HST  - Reports using CPAP every night and her REMIGIO symptoms have improved dramatically.     #Vitamin D insufficiency  - 15 on 3/2022, was 17 year prior  - Currently recently rx 1000 IU daily, previously was 2000 IU daily or 13991 IU weekly cholecalciferol   - Recheck level, consider increasing to 2000 IU daily or 40605 weekly     #Contraception  - currently on levonorgestrel Altavera  - Gyn in the past had stopped COCPs in the setting of uncontrolled HTN and HLD, and had transitioned to norethindrone 0.35 daily   - However pt was restarted on the COCPs in 01/2022 or 03/2022 at obgyn appointment  - To be addressed at next appointment.     #Health maintenance  - S/p pap Feb 2020, NILM/HPV neg, for repeat cotest in 5 years  - Declines flu shot this season  - HIV Ab screen and syphilis screening negative in 2020  - Td booster 03/2021  - Notes completion of childhood vaccines  - Covid x2, encouraged to get 3rd vaccine.    Follow-up in 6 months.    Patient and plan discussed with attending physician Dr. Mooney.    Ben  MD Obdulio  PGY-2 Internal Medicine  [facility]

## 2025-01-18 LAB — 25(OH)D3 SERPL-MCNC: 13 NG/ML (ref 30–100)

## 2025-01-18 NOTE — PROGRESS NOTES
I reviewed the resident/fellow's documentation and discussed the patient with the resident/fellow. I agree with the resident/fellow's medical decision making as documented in the note.   Follow up [facility]  Logan Mooney MD

## 2025-01-23 NOTE — PROGRESS NOTES
Reached out to patient to inform her of her low vitamin D level and restart taking supplements. Vit D script sent to her pharmacy. Pt verbalized understanding

## 2025-03-17 ENCOUNTER — TELEPHONE (OUTPATIENT)
Dept: PRIMARY CARE | Facility: HOSPITAL | Age: 40
End: 2025-03-17
Payer: MEDICARE

## 2025-03-17 DIAGNOSIS — Z76.0 MEDICATION REFILL: ICD-10-CM

## 2025-03-17 RX ORDER — LEVONORGESTREL AND ETHINYL ESTRADIOL 0.15-0.03
1 KIT ORAL DAILY
Qty: 84 TABLET | Refills: 3 | Status: SHIPPED | OUTPATIENT
Start: 2025-03-17

## 2025-03-18 RX ORDER — LEVONORGESTREL AND ETHINYL ESTRADIOL 0.15-0.03
1 KIT ORAL DAILY
Qty: 84 TABLET | Refills: 3 | Status: SHIPPED | OUTPATIENT
Start: 2025-03-18

## 2025-06-10 ENCOUNTER — APPOINTMENT (OUTPATIENT)
Dept: PRIMARY CARE | Facility: HOSPITAL | Age: 40
End: 2025-06-10
Payer: MEDICARE

## 2025-06-20 ENCOUNTER — TELEPHONE (OUTPATIENT)
Dept: PRIMARY CARE | Facility: HOSPITAL | Age: 40
End: 2025-06-20
Payer: MEDICARE